# Patient Record
Sex: FEMALE | Race: BLACK OR AFRICAN AMERICAN | NOT HISPANIC OR LATINO | URBAN - METROPOLITAN AREA
[De-identification: names, ages, dates, MRNs, and addresses within clinical notes are randomized per-mention and may not be internally consistent; named-entity substitution may affect disease eponyms.]

---

## 2018-01-17 ENCOUNTER — EMERGENCY (EMERGENCY)
Facility: HOSPITAL | Age: 38
LOS: 1 days | Discharge: HOME | End: 2018-01-17
Attending: EMERGENCY MEDICINE | Admitting: EMERGENCY MEDICINE
Payer: COMMERCIAL

## 2018-01-17 VITALS
DIASTOLIC BLOOD PRESSURE: 86 MMHG | TEMPERATURE: 98 F | RESPIRATION RATE: 16 BRPM | OXYGEN SATURATION: 98 % | HEART RATE: 76 BPM | SYSTOLIC BLOOD PRESSURE: 148 MMHG

## 2018-01-17 VITALS
TEMPERATURE: 98 F | OXYGEN SATURATION: 98 % | SYSTOLIC BLOOD PRESSURE: 181 MMHG | HEART RATE: 69 BPM | DIASTOLIC BLOOD PRESSURE: 98 MMHG | WEIGHT: 293 LBS | RESPIRATION RATE: 18 BRPM

## 2018-01-17 DIAGNOSIS — B34.9 VIRAL INFECTION, UNSPECIFIED: ICD-10-CM

## 2018-01-17 DIAGNOSIS — Z79.899 OTHER LONG TERM (CURRENT) DRUG THERAPY: ICD-10-CM

## 2018-01-17 DIAGNOSIS — J11.1 INFLUENZA DUE TO UNIDENTIFIED INFLUENZA VIRUS WITH OTHER RESPIRATORY MANIFESTATIONS: ICD-10-CM

## 2018-01-17 LAB
HCOV NL63 RNA SPEC QL NAA+PROBE: DETECTED
RAPID RVP RESULT: DETECTED

## 2018-01-17 PROCEDURE — 87633 RESP VIRUS 12-25 TARGETS: CPT

## 2018-01-17 PROCEDURE — 87581 M.PNEUMON DNA AMP PROBE: CPT

## 2018-01-17 PROCEDURE — 71046 X-RAY EXAM CHEST 2 VIEWS: CPT

## 2018-01-17 PROCEDURE — 87486 CHLMYD PNEUM DNA AMP PROBE: CPT

## 2018-01-17 PROCEDURE — 99284 EMERGENCY DEPT VISIT MOD MDM: CPT

## 2018-01-17 PROCEDURE — 71046 X-RAY EXAM CHEST 2 VIEWS: CPT | Mod: 26

## 2018-01-17 PROCEDURE — 99283 EMERGENCY DEPT VISIT LOW MDM: CPT | Mod: 25

## 2018-01-17 PROCEDURE — 87798 DETECT AGENT NOS DNA AMP: CPT

## 2018-01-17 RX ORDER — IBUPROFEN 200 MG
600 TABLET ORAL ONCE
Qty: 0 | Refills: 0 | Status: COMPLETED | OUTPATIENT
Start: 2018-01-17 | End: 2018-01-17

## 2018-01-17 RX ORDER — DEXAMETHASONE 0.5 MG/5ML
10 ELIXIR ORAL ONCE
Qty: 0 | Refills: 0 | Status: COMPLETED | OUTPATIENT
Start: 2018-01-17 | End: 2018-01-17

## 2018-01-17 RX ADMIN — Medication 10 MILLIGRAM(S): at 20:19

## 2018-01-17 RX ADMIN — Medication 600 MILLIGRAM(S): at 19:57

## 2018-01-17 RX ADMIN — Medication 600 MILLIGRAM(S): at 21:34

## 2018-01-17 NOTE — ED ADULT NURSE NOTE - OBJECTIVE STATEMENT
patient is a 38 y/o female complaining of cough since monday. patient states "my mom and my daughter are sick at home and I started to feel sick monday, I cough all day and nothing comes up, its starting to hurt". patient denies fevers, but reports nonproductive cough.

## 2018-01-17 NOTE — ED PROVIDER NOTE - PHYSICAL EXAMINATION
VITAL SIGNS: I have reviewed nursing notes and confirm.  CONSTITUTIONAL: Well-developed; well-nourished female comfortable in stretcher speaking clearly in complete sentences; in no acute distress.  SKIN: Agree with RN documentation regarding decubitus evaluation. Remainder of skin exam is warm and dry, no acute rash.  HEAD: Normocephalic; atraumatic.  EYES: PERRL, EOM intact; conjunctiva and sclera clear.  ENT: No nasal discharge; airway clear. no pharyngeal/tonsillar exudates or swelling, no petechiae  NECK: Supple; non tender.  CARD: S1, S2 normal; no murmurs, gallops, or rubs. RRR  RESP: CTA b/l w/good excursion, no inc wob or pleuritic pain.  ABD: Normal bowel sounds; soft; non-distended; non-tender  EXT: Normal ROM. No clubbing, cyanosis or edema.  LYMPH: No acute cervical adenopathy.  NEURO: Alert, oriented. Grossly unremarkable.  PSYCH: Cooperative, appropriate.

## 2018-01-17 NOTE — ED PROVIDER NOTE - OBJECTIVE STATEMENT
36 y/o F non-smoker w/no sig pmhx p/w tactile fever, chills, cough productive with white/yellow sputim, myalgias, rhinorrhea, fatigue for 2 days with sick contacts in her home with same sx. No abd pain or n/v/c/d. Denies urinary sx. No CP/SOB.

## 2018-01-17 NOTE — ED PROVIDER NOTE - MEDICAL DECISION MAKING DETAILS
+ viral infection, no infiltrate on CXR, saturating well, comfortable, no fever. Safe to d/c home with supportive care. Given return precautions and anticipatory guidance.

## 2018-07-31 ENCOUNTER — EMERGENCY (EMERGENCY)
Facility: HOSPITAL | Age: 38
LOS: 1 days | Discharge: HOME | End: 2018-07-31
Attending: EMERGENCY MEDICINE | Admitting: EMERGENCY MEDICINE
Payer: COMMERCIAL

## 2018-07-31 VITALS
HEART RATE: 80 BPM | RESPIRATION RATE: 18 BRPM | TEMPERATURE: 98 F | SYSTOLIC BLOOD PRESSURE: 154 MMHG | OXYGEN SATURATION: 96 % | WEIGHT: 293 LBS | DIASTOLIC BLOOD PRESSURE: 94 MMHG

## 2018-07-31 VITALS
DIASTOLIC BLOOD PRESSURE: 79 MMHG | SYSTOLIC BLOOD PRESSURE: 118 MMHG | TEMPERATURE: 98 F | RESPIRATION RATE: 18 BRPM | OXYGEN SATURATION: 99 % | HEART RATE: 65 BPM

## 2018-07-31 DIAGNOSIS — R60.0 LOCALIZED EDEMA: ICD-10-CM

## 2018-07-31 DIAGNOSIS — B20 HUMAN IMMUNODEFICIENCY VIRUS [HIV] DISEASE: ICD-10-CM

## 2018-07-31 DIAGNOSIS — Z79.899 OTHER LONG TERM (CURRENT) DRUG THERAPY: ICD-10-CM

## 2018-07-31 PROBLEM — I10 ESSENTIAL (PRIMARY) HYPERTENSION: Chronic | Status: ACTIVE | Noted: 2018-01-17

## 2018-07-31 LAB
ALBUMIN SERPL ELPH-MCNC: 4.1 G/DL — SIGNIFICANT CHANGE UP (ref 3.3–5)
ALP SERPL-CCNC: 38 U/L — LOW (ref 40–120)
ALT FLD-CCNC: 12 U/L — SIGNIFICANT CHANGE UP (ref 10–45)
ANION GAP SERPL CALC-SCNC: 13 MMOL/L — SIGNIFICANT CHANGE UP (ref 5–17)
AST SERPL-CCNC: 18 U/L — SIGNIFICANT CHANGE UP (ref 10–40)
BASOPHILS NFR BLD AUTO: 0.3 % — SIGNIFICANT CHANGE UP (ref 0–2)
BILIRUB SERPL-MCNC: 0.6 MG/DL — SIGNIFICANT CHANGE UP (ref 0.2–1.2)
BUN SERPL-MCNC: 12 MG/DL — SIGNIFICANT CHANGE UP (ref 7–23)
CALCIUM SERPL-MCNC: 10 MG/DL — SIGNIFICANT CHANGE UP (ref 8.4–10.5)
CHLORIDE SERPL-SCNC: 101 MMOL/L — SIGNIFICANT CHANGE UP (ref 96–108)
CO2 SERPL-SCNC: 25 MMOL/L — SIGNIFICANT CHANGE UP (ref 22–31)
CREAT SERPL-MCNC: 0.86 MG/DL — SIGNIFICANT CHANGE UP (ref 0.5–1.3)
EOSINOPHIL NFR BLD AUTO: 1.6 % — SIGNIFICANT CHANGE UP (ref 0–6)
GLUCOSE SERPL-MCNC: 100 MG/DL — HIGH (ref 70–99)
HCT VFR BLD CALC: 34.3 % — LOW (ref 34.5–45)
HGB BLD-MCNC: 11.2 G/DL — LOW (ref 11.5–15.5)
LYMPHOCYTES # BLD AUTO: 39.7 % — SIGNIFICANT CHANGE UP (ref 13–44)
MCHC RBC-ENTMCNC: 28.8 PG — SIGNIFICANT CHANGE UP (ref 27–34)
MCHC RBC-ENTMCNC: 32.7 G/DL — SIGNIFICANT CHANGE UP (ref 32–36)
MCV RBC AUTO: 88.2 FL — SIGNIFICANT CHANGE UP (ref 80–100)
MONOCYTES NFR BLD AUTO: 7.3 % — SIGNIFICANT CHANGE UP (ref 2–14)
NEUTROPHILS NFR BLD AUTO: 51.1 % — SIGNIFICANT CHANGE UP (ref 43–77)
PLATELET # BLD AUTO: 265 K/UL — SIGNIFICANT CHANGE UP (ref 150–400)
POTASSIUM SERPL-MCNC: 4.2 MMOL/L — SIGNIFICANT CHANGE UP (ref 3.5–5.3)
POTASSIUM SERPL-SCNC: 4.2 MMOL/L — SIGNIFICANT CHANGE UP (ref 3.5–5.3)
PROT SERPL-MCNC: 7.7 G/DL — SIGNIFICANT CHANGE UP (ref 6–8.3)
RBC # BLD: 3.89 M/UL — SIGNIFICANT CHANGE UP (ref 3.8–5.2)
RBC # FLD: 14.9 % — SIGNIFICANT CHANGE UP (ref 10.3–16.9)
SODIUM SERPL-SCNC: 139 MMOL/L — SIGNIFICANT CHANGE UP (ref 135–145)
WBC # BLD: 11.5 K/UL — HIGH (ref 3.8–10.5)
WBC # FLD AUTO: 11.5 K/UL — HIGH (ref 3.8–10.5)

## 2018-07-31 PROCEDURE — 99284 EMERGENCY DEPT VISIT MOD MDM: CPT

## 2018-07-31 PROCEDURE — 93970 EXTREMITY STUDY: CPT

## 2018-07-31 PROCEDURE — 85025 COMPLETE CBC W/AUTO DIFF WBC: CPT

## 2018-07-31 PROCEDURE — 83880 ASSAY OF NATRIURETIC PEPTIDE: CPT

## 2018-07-31 PROCEDURE — 71046 X-RAY EXAM CHEST 2 VIEWS: CPT

## 2018-07-31 PROCEDURE — 80053 COMPREHEN METABOLIC PANEL: CPT

## 2018-07-31 PROCEDURE — 99284 EMERGENCY DEPT VISIT MOD MDM: CPT | Mod: 25

## 2018-07-31 PROCEDURE — 93970 EXTREMITY STUDY: CPT | Mod: 26

## 2018-07-31 PROCEDURE — 71046 X-RAY EXAM CHEST 2 VIEWS: CPT | Mod: 26

## 2018-07-31 NOTE — ED ADULT NURSE NOTE - CHIEF COMPLAINT QUOTE
pt c/o bilateral leg swelling and right knee pain since Saturday. pt denies fall or recent injuries. pmh Hypertension. pt reports taking her water pill and still retaining fluids.

## 2018-07-31 NOTE — ED PROVIDER NOTE - OBJECTIVE STATEMENT
38 year old female with history of HIV presents to ED with concern for bilateral lower extremity edema which she feels has increased over the past 2 days.  Patient states she has been more ambulatory lately walking around the city with her daughter and on her feet at work as a .  She denies associated shortness of breath, abdominal pain/distension, fever, chills, chest pain, history of DVT/PE or any additional acute complaints or concerns at this time.

## 2018-07-31 NOTE — ED ADULT TRIAGE NOTE - CHIEF COMPLAINT QUOTE
pt c/o bilateral leg swelling and right knee pain since Saturday. pt denies fall or recent injuries. pmh Hypertension. pt c/o bilateral leg swelling and right knee pain since Saturday. pt denies fall or recent injuries. pmh Hypertension. pt reports taking her water pill and still retaining fluids.

## 2018-07-31 NOTE — ED PROVIDER NOTE - MEDICAL DECISION MAKING DETAILS
pt in ED w concern for bilateral LE edema, worsening over the past 2 days.  Hx of baseline edema noted.  Labs unremarkable.   CXR reviewed.  DVT studies ordered and without evidence of deep venous thrombosis.  All results are discussed with patient at bedside.  I have educated patient regarding use of compression stockings, lifting extremities while at rest and need for prompt PCP follow up in 1-2 days for re evaluation and possible discussion of water pill use, etc.  Patient is instructed to return immediately to ED should her symptoms worsen or if she has any concern prior to this follow up.  Patient aware of plan and verbalizes her understanding.

## 2018-07-31 NOTE — ED PROVIDER NOTE - MUSCULOSKELETAL, MLM
+ non pitting edema to bilateral LEs.  No overlying cellulitis or skin breakdown.  Spine appears normal, range of motion is not limited, no muscle or joint tenderness

## 2018-07-31 NOTE — ED ADULT NURSE NOTE - OBJECTIVE STATEMENT
Pt c/o bilateral knee/leg pain and bilateral lower leg swelling. Pt states she has hx of R knee injury two years ago and every now and then her R knee will hurt and swell, but this time both legs hurt. Pt states she's on HTN medication daily, but unsure of the name.

## 2018-07-31 NOTE — ED ADULT NURSE NOTE - NSIMPLEMENTINTERV_GEN_ALL_ED
Implemented All Universal Safety Interventions:  [place] to call system. Call bell, personal items and telephone within reach. Instruct patient to call for assistance. Room bathroom lighting operational. Non-slip footwear when patient is off stretcher. Physically safe environment: no spills, clutter or unnecessary equipment. Stretcher in lowest position, wheels locked, appropriate side rails in place.

## 2018-07-31 NOTE — ED PROVIDER NOTE - MUSCULOSKELETAL NEGATIVE STATEMENT, MLM
+ bilateral lower extremity swelling.  no back pain, no gout, no musculoskeletal pain, no neck pain, and no weakness.

## 2019-02-25 NOTE — ED ADULT TRIAGE NOTE - MODE OF ARRIVAL
Suspected by history  Kit continues to maintain full testable distal median motor and sensory function  --scheduled for electrodiagnostic study on March 13th   --also will have her TSH drawn along with her above outlined blood work  Walk in

## 2019-03-19 ENCOUNTER — EMERGENCY (EMERGENCY)
Facility: HOSPITAL | Age: 39
LOS: 1 days | Discharge: HOME | End: 2019-03-19
Attending: EMERGENCY MEDICINE | Admitting: EMERGENCY MEDICINE
Payer: COMMERCIAL

## 2019-03-19 VITALS
HEART RATE: 72 BPM | TEMPERATURE: 98 F | SYSTOLIC BLOOD PRESSURE: 145 MMHG | OXYGEN SATURATION: 99 % | DIASTOLIC BLOOD PRESSURE: 97 MMHG | RESPIRATION RATE: 18 BRPM

## 2019-03-19 VITALS
RESPIRATION RATE: 18 BRPM | HEART RATE: 88 BPM | SYSTOLIC BLOOD PRESSURE: 153 MMHG | DIASTOLIC BLOOD PRESSURE: 101 MMHG | OXYGEN SATURATION: 98 % | TEMPERATURE: 99 F

## 2019-03-19 DIAGNOSIS — Z79.899 OTHER LONG TERM (CURRENT) DRUG THERAPY: ICD-10-CM

## 2019-03-19 DIAGNOSIS — R05 COUGH: ICD-10-CM

## 2019-03-19 DIAGNOSIS — J06.9 ACUTE UPPER RESPIRATORY INFECTION, UNSPECIFIED: ICD-10-CM

## 2019-03-19 DIAGNOSIS — I10 ESSENTIAL (PRIMARY) HYPERTENSION: ICD-10-CM

## 2019-03-19 LAB
HCOV PNL SPEC NAA+PROBE: DETECTED
RAPID RVP RESULT: DETECTED

## 2019-03-19 PROCEDURE — 87581 M.PNEUMON DNA AMP PROBE: CPT

## 2019-03-19 PROCEDURE — 87486 CHLMYD PNEUM DNA AMP PROBE: CPT

## 2019-03-19 PROCEDURE — 71046 X-RAY EXAM CHEST 2 VIEWS: CPT | Mod: 26

## 2019-03-19 PROCEDURE — 71046 X-RAY EXAM CHEST 2 VIEWS: CPT

## 2019-03-19 PROCEDURE — 87633 RESP VIRUS 12-25 TARGETS: CPT

## 2019-03-19 PROCEDURE — 99283 EMERGENCY DEPT VISIT LOW MDM: CPT | Mod: 25

## 2019-03-19 PROCEDURE — 87798 DETECT AGENT NOS DNA AMP: CPT

## 2019-03-19 RX ORDER — IBUPROFEN 200 MG
600 TABLET ORAL ONCE
Qty: 0 | Refills: 0 | Status: COMPLETED | OUTPATIENT
Start: 2019-03-19 | End: 2019-03-19

## 2019-03-19 RX ORDER — BENZOCAINE AND MENTHOL 5; 1 G/100ML; G/100ML
1 LIQUID ORAL ONCE
Qty: 0 | Refills: 0 | Status: COMPLETED | OUTPATIENT
Start: 2019-03-19 | End: 2019-03-19

## 2019-03-19 RX ORDER — ACETAMINOPHEN 500 MG
650 TABLET ORAL ONCE
Qty: 0 | Refills: 0 | Status: COMPLETED | OUTPATIENT
Start: 2019-03-19 | End: 2019-03-19

## 2019-03-19 RX ADMIN — BENZOCAINE AND MENTHOL 1 LOZENGE: 5; 1 LIQUID ORAL at 12:43

## 2019-03-19 RX ADMIN — Medication 650 MILLIGRAM(S): at 12:44

## 2019-03-19 RX ADMIN — Medication 200 MILLIGRAM(S): at 12:44

## 2019-03-19 RX ADMIN — Medication 600 MILLIGRAM(S): at 12:44

## 2019-03-19 NOTE — ED PROVIDER NOTE - NSFOLLOWUPINSTRUCTIONS_ED_ALL_ED_FT
Take dayquil (or nyquil depending on time of day) for your symptoms  Use cepacol for sore throat  Drink tea with honey for sore throat and cough  Drink plenty of clear fluids    Follow up with your primary care doctor if symptoms fail to improve  Return to the Emergency Department if you develop fever>100.4F, worsening cough, shortness of breath, or any other concerns.

## 2019-03-19 NOTE — ED PROVIDER NOTE - ATTENDING CONTRIBUTION TO CARE
37 yo female h/o obesity, HTN c/o uri sx x 2 days - subjective fever, myalgias, rhinorrhea, nasal congestion, sore throat, cough w/o cp/sob; sx started after ill exposure on public transport.  No travel.  Pt using tylenol cold and sinus w/o sig relief.  No abdominal pain, N/V/D. Well appearing, nad, nc/at, lung cta, heart reg, abd soft, nt, ext no gross deformity, no gross neuro deficits  Pt w viral uri sx - supportive care reviewed; + coronavirus on rvp, nl cxr; will dc to fu pmd.

## 2019-03-19 NOTE — ED ADULT NURSE NOTE - NSFALLRSKASSESSTYPE_ED_ALL_ED
Initial (On Arrival) no dermatitis, no environmental allergies, no food allergies, no immunosuppressive disorder, and no pruritus.

## 2019-03-19 NOTE — ED PROVIDER NOTE - CLINICAL SUMMARY MEDICAL DECISION MAKING FREE TEXT BOX
Given tylenol 650mg po, ibuprofen 600mg po, cepacol drop and tessalon perles while in ED for sx. HCG neg. RVP pending. CXR pending. Lungs CTAB, no increased work of breathing. Given tylenol 650mg po, ibuprofen 600mg po, cepacol drop and tessalon perles while in ED for sx. HCG neg. RVP +coronavirus. CXR without acute cardiopulmonary abnormality. Discussed all results with patient. Continue symptomatic management at home. Return precautions given.

## 2019-03-19 NOTE — ED PROVIDER NOTE - OBJECTIVE STATEMENT
39yo female with pmhx of HTN presents with 2 days of subj fever, nasal congestion, sore throat, cough and body aches. Reports symptoms began after someone on the bus coughed on her. Took tylenol cold and sinus x1 without relief. Denies ear pain, difficulty swallowing, sputum production, shortness of breath, abdominal pain, N/V/D. Reports influenza vaccine this year. Denies hx of asthma. Denies smoking hx.

## 2019-03-19 NOTE — ED PROVIDER NOTE - NS ED ROS FT
Constitutional: +subj fever. No chills.  Eyes: No redness. No discharge. No vision change.   ENT: +sore throat. No ear pain.  Cardiovascular: No chest pain. No leg swelling.  Respiratory: +cough. No shortness of breath.  GI: No abdominal pain. No vomiting. No diarrhea.   MSK: No joint pain. No back pain.   Skin: No rash. No abrasions.   Neuro: No numbness. No weakness.   Psych: No known mental health issues.

## 2019-03-19 NOTE — ED ADULT NURSE NOTE - OBJECTIVE STATEMENT
Pt presents to ED reporting body aches, headache, runny nose, sore throat, and non productive cough since Saturday evening. Reports no relief with tylenol cold and sinus yesterday at home. Denies fever. No SOB. Hx HTN.

## 2019-03-19 NOTE — ED ADULT TRIAGE NOTE - CHIEF COMPLAINT QUOTE
pt c/o cough, body aches, chills, runny nose, chest pain since Sunday. mask applied. pt has history of HTN and did not take BP meds today.

## 2019-03-19 NOTE — ED PROVIDER NOTE - PHYSICAL EXAMINATION
VITAL SIGNS: I have reviewed nursing notes and confirm.  CONSTITUTIONAL: Well-developed; well-nourished; in no acute distress.   SKIN:  warm and dry, no acute rash.   HEAD:  normocephalic, atraumatic.  EYES: PERRL, EOM intact; conjunctiva and sclera clear.  ENT: No nasal discharge; airway clear. No posterior oropharynx erythema. No tonsillar edema or exudates. Uvula midline. Maintaining secretions appropriately. TMs non-erythematous, non-bulging bilaterally.   NECK: Supple; non tender.  CARD: S1, S2 normal; no murmurs, gallops, or rubs. Regular rate and rhythm.   RESP:  Clear to auscultation b/l, no wheezes, rales or rhonchi.  ABD: Normal bowel sounds; soft; non-distended; non-tender; no guarding/ rebound.  EXT: Normal ROM. No clubbing, cyanosis or edema. 2+ pulses to b/l ue/le.  NEURO: Alert, oriented, grossly unremarkable  PSYCH: Cooperative, mood and affect appropriate.

## 2019-07-03 ENCOUNTER — EMERGENCY (EMERGENCY)
Facility: HOSPITAL | Age: 39
LOS: 1 days | Discharge: HOME | End: 2019-07-03
Admitting: EMERGENCY MEDICINE
Payer: COMMERCIAL

## 2019-07-03 VITALS
RESPIRATION RATE: 18 BRPM | WEIGHT: 293 LBS | HEART RATE: 93 BPM | DIASTOLIC BLOOD PRESSURE: 104 MMHG | OXYGEN SATURATION: 98 % | TEMPERATURE: 98 F | SYSTOLIC BLOOD PRESSURE: 149 MMHG | HEIGHT: 66 IN

## 2019-07-03 DIAGNOSIS — M25.561 PAIN IN RIGHT KNEE: ICD-10-CM

## 2019-07-03 DIAGNOSIS — G89.29 OTHER CHRONIC PAIN: ICD-10-CM

## 2019-07-03 PROCEDURE — 99282 EMERGENCY DEPT VISIT SF MDM: CPT

## 2019-07-03 RX ORDER — ACETAMINOPHEN 500 MG
650 TABLET ORAL ONCE
Refills: 0 | Status: COMPLETED | OUTPATIENT
Start: 2019-07-03 | End: 2019-07-03

## 2019-07-03 RX ADMIN — Medication 650 MILLIGRAM(S): at 20:32

## 2019-07-03 NOTE — ED PROVIDER NOTE - CARE PROVIDER_API CALL
Williams Chase)  Orthopaedic Surgery  [address]  [place], [place] [address]  Phone: [phone]  Fax: [phone]  Follow Up Time:

## 2019-07-03 NOTE — ED PROVIDER NOTE - CLINICAL SUMMARY MEDICAL DECISION MAKING FREE TEXT BOX
40 yo female, morbidly obese, in the Er due to worsening of her right knee pain. Pt reports chronic b/l knee pain, but right worse now. No known injuries, no new leg edema or discolorations. pt reports that at work she has to stand for 8  hours and its makes her pain worse. No clinical suspicion for any fx/dislocation, or infection to knee joints. plan: symptomatic treatment, ortho f/u. pt agrees with a treatment plan, requests day off from work.

## 2019-07-03 NOTE — ED PROVIDER NOTE - OBJECTIVE STATEMENT
38 y/o F with obesity, HTN on HCTZ and HIV (unsure of CD4 count and undetectable viral load) presents to the ED with complaints of chronic R knee pain. Patient works as a  and notes symptoms worsened with constant standing. Patient without new injury or neurological deficits. Patient has not taken any medications for pain. Denies redness, discoloration or any other acute complaints. 40 y/o female, morbidly obese with h/o HTN , HIV (unsure of CD4 count and undetectable viral load) presents to the ED with complaints of worsening of her chronic knees pain. Patient works as a , notes that pain became worse for the past 3 days. Pt reports that she has no more chair to sit and rest at work and now she has to stand for 8 hours. Patient denies any  new injury to her knees, denies new leg swelling, Patient has not taken any  medications for pain.

## 2019-07-03 NOTE — ED ADULT NURSE REASSESSMENT NOTE - NS ED NURSE REASSESS COMMENT FT1
Pt with high BP, provider aware, per pt Hx HTN, working on med regiment with PCP and did not take meds today. No HA/dizziness/CP/SOB. Pt to f/u with PCP next week, dc'd without difficulty and ambulated from ED with steady gait.

## 2019-07-03 NOTE — ED ADULT NURSE NOTE - OBJECTIVE STATEMENT
40 y/o female c/o right knee pain that began 3 days ago w/o any associated sx. Denies numbness, tingling, redness, and any other sx.

## 2019-07-03 NOTE — ED PROVIDER NOTE - CONSTITUTIONAL, MLM
normal... Well appearing, Obese, well nourished and in no apparent distress. Well appearing, in no apparent distress.

## 2019-07-03 NOTE — ED ADULT NURSE NOTE - NSIMPLEMENTINTERV_GEN_ALL_ED
Implemented All Universal Safety Interventions:  [place] to call system. Call bell, personal items and telephone within reach. Instruct patient to call for assistance. Room bathroom lighting operational. Non-slip footwear when patient is off stretcher. Physically safe environment: no spills, clutter or unnecessary equipment. Stretcher in lowest position, wheels locked, appropriate side rails in place.

## 2019-07-03 NOTE — ED PROVIDER NOTE - MUSCULOSKELETAL, MLM
Spine appears normal, all extremities grossly intact and range of motion is not limited, b/l  LE  chronic edema, good distal pulses, no discolorations, b/l knee- NROM,

## 2019-09-18 ENCOUNTER — EMERGENCY (EMERGENCY)
Facility: HOSPITAL | Age: 39
LOS: 1 days | Discharge: HOME | End: 2019-09-18
Attending: EMERGENCY MEDICINE | Admitting: EMERGENCY MEDICINE
Payer: COMMERCIAL

## 2019-09-18 VITALS
OXYGEN SATURATION: 98 % | RESPIRATION RATE: 20 BRPM | SYSTOLIC BLOOD PRESSURE: 157 MMHG | DIASTOLIC BLOOD PRESSURE: 93 MMHG | HEIGHT: 67 IN | HEART RATE: 88 BPM | TEMPERATURE: 98 F | WEIGHT: 293 LBS

## 2019-09-18 PROBLEM — B20 HUMAN IMMUNODEFICIENCY VIRUS [HIV] DISEASE: Chronic | Status: ACTIVE | Noted: 2019-07-04

## 2019-09-18 LAB
ALBUMIN SERPL ELPH-MCNC: 4 G/DL — SIGNIFICANT CHANGE UP (ref 3.3–5)
ALP SERPL-CCNC: 50 U/L — SIGNIFICANT CHANGE UP (ref 40–120)
ALT FLD-CCNC: 13 U/L — SIGNIFICANT CHANGE UP (ref 10–45)
ANION GAP SERPL CALC-SCNC: 10 MMOL/L — SIGNIFICANT CHANGE UP (ref 5–17)
AST SERPL-CCNC: 19 U/L — SIGNIFICANT CHANGE UP (ref 10–40)
BASOPHILS # BLD AUTO: 0.03 K/UL — SIGNIFICANT CHANGE UP (ref 0–0.2)
BASOPHILS NFR BLD AUTO: 0.3 % — SIGNIFICANT CHANGE UP (ref 0–2)
BILIRUB SERPL-MCNC: 0.5 MG/DL — SIGNIFICANT CHANGE UP (ref 0.2–1.2)
BUN SERPL-MCNC: 14 MG/DL — SIGNIFICANT CHANGE UP (ref 7–23)
CALCIUM SERPL-MCNC: 9.7 MG/DL — SIGNIFICANT CHANGE UP (ref 8.4–10.5)
CHLORIDE SERPL-SCNC: 105 MMOL/L — SIGNIFICANT CHANGE UP (ref 96–108)
CO2 SERPL-SCNC: 25 MMOL/L — SIGNIFICANT CHANGE UP (ref 22–31)
CREAT SERPL-MCNC: 0.86 MG/DL — SIGNIFICANT CHANGE UP (ref 0.5–1.3)
EOSINOPHIL # BLD AUTO: 0.2 K/UL — SIGNIFICANT CHANGE UP (ref 0–0.5)
EOSINOPHIL NFR BLD AUTO: 1.9 % — SIGNIFICANT CHANGE UP (ref 0–6)
GLUCOSE SERPL-MCNC: 94 MG/DL — SIGNIFICANT CHANGE UP (ref 70–99)
HCT VFR BLD CALC: 35.9 % — SIGNIFICANT CHANGE UP (ref 34.5–45)
HGB BLD-MCNC: 11.4 G/DL — LOW (ref 11.5–15.5)
IMM GRANULOCYTES NFR BLD AUTO: 0.3 % — SIGNIFICANT CHANGE UP (ref 0–1.5)
LYMPHOCYTES # BLD AUTO: 39.7 % — SIGNIFICANT CHANGE UP (ref 13–44)
LYMPHOCYTES # BLD AUTO: 4.29 K/UL — HIGH (ref 1–3.3)
MCHC RBC-ENTMCNC: 28.8 PG — SIGNIFICANT CHANGE UP (ref 27–34)
MCHC RBC-ENTMCNC: 31.8 GM/DL — LOW (ref 32–36)
MCV RBC AUTO: 90.7 FL — SIGNIFICANT CHANGE UP (ref 80–100)
MONOCYTES # BLD AUTO: 0.84 K/UL — SIGNIFICANT CHANGE UP (ref 0–0.9)
MONOCYTES NFR BLD AUTO: 7.8 % — SIGNIFICANT CHANGE UP (ref 2–14)
NEUTROPHILS # BLD AUTO: 5.41 K/UL — SIGNIFICANT CHANGE UP (ref 1.8–7.4)
NEUTROPHILS NFR BLD AUTO: 50 % — SIGNIFICANT CHANGE UP (ref 43–77)
NRBC # BLD: 0 /100 WBCS — SIGNIFICANT CHANGE UP (ref 0–0)
NT-PROBNP SERPL-SCNC: 91 PG/ML — SIGNIFICANT CHANGE UP (ref 0–300)
PLATELET # BLD AUTO: 267 K/UL — SIGNIFICANT CHANGE UP (ref 150–400)
POTASSIUM SERPL-MCNC: 4.9 MMOL/L — SIGNIFICANT CHANGE UP (ref 3.5–5.3)
POTASSIUM SERPL-SCNC: 4.9 MMOL/L — SIGNIFICANT CHANGE UP (ref 3.5–5.3)
PROT SERPL-MCNC: 7.9 G/DL — SIGNIFICANT CHANGE UP (ref 6–8.3)
RBC # BLD: 3.96 M/UL — SIGNIFICANT CHANGE UP (ref 3.8–5.2)
RBC # FLD: 14.5 % — SIGNIFICANT CHANGE UP (ref 10.3–14.5)
SODIUM SERPL-SCNC: 140 MMOL/L — SIGNIFICANT CHANGE UP (ref 135–145)
WBC # BLD: 10.8 K/UL — HIGH (ref 3.8–10.5)
WBC # FLD AUTO: 10.8 K/UL — HIGH (ref 3.8–10.5)

## 2019-09-18 PROCEDURE — 99284 EMERGENCY DEPT VISIT MOD MDM: CPT

## 2019-09-18 PROCEDURE — 99283 EMERGENCY DEPT VISIT LOW MDM: CPT

## 2019-09-18 PROCEDURE — 36415 COLL VENOUS BLD VENIPUNCTURE: CPT

## 2019-09-18 PROCEDURE — 80053 COMPREHEN METABOLIC PANEL: CPT

## 2019-09-18 PROCEDURE — 85025 COMPLETE CBC W/AUTO DIFF WBC: CPT

## 2019-09-18 PROCEDURE — 83880 ASSAY OF NATRIURETIC PEPTIDE: CPT

## 2019-09-18 NOTE — ED PROVIDER NOTE - NSFOLLOWUPINSTRUCTIONS_ED_ALL_ED_FT
As we discussed, wear compression stockings for swelling or use the ACE wraps given. Keep the legs elevated above the level of the heart when you are able to. You need to see a Vascular Surgery doctor - you may call to make an appointment or call our Referrals Coordinator, Pricila, for help at [phone].     Edema (SWELLING)    Edema is when you have too much fluid in your body or under your skin. Edema may make your legs, feet, and ankles swell up. Swelling is also common in looser tissues, like around your eyes. This is a common condition. It gets more common as you get older. There are many possible causes of edema. Eating too much salt (sodium) and being on your feet or sitting for a long time can cause edema in your legs, feet, and ankles. Hot weather may make edema worse.    Edema is usually painless. Your skin may look swollen or shiny.    Follow these instructions at home:  Keep the swollen body part raised (elevated) above the level of your heart when you are sitting or lying down.  Do not sit still or stand for a long time.  Do not wear tight clothes. Do not wear garters on your upper legs.  Exercise your legs. This can help the swelling go down.  Wear elastic bandages or support stockings as told by your doctor.  Eat a low-salt (low-sodium) diet to reduce fluid as told by your doctor.  Depending on the cause of your swelling, you may need to limit how much fluid you drink (fluid restriction).  Take over-the-counter and prescription medicines only as told by your doctor.  Contact a doctor if:  Treatment is not working.  You have heart, liver, or kidney disease and have symptoms of edema.  You have sudden and unexplained weight gain.  Get help right away if:  You have shortness of breath or chest pain.  You cannot breathe when you lie down.  You have pain, redness, or warmth in the swollen areas.  You have heart, liver, or kidney disease and get edema all of a sudden.  You have a fever and your symptoms get worse all of a sudden.  Summary  Edema is when you have too much fluid in your body or under your skin.  Edema may make your legs, feet, and ankles swell up. Swelling is also common in looser tissues, like around your eyes.  Raise (elevate) the swollen body part above the level of your heart when you are sitting or lying down.  Follow your doctor's instructions about diet and how much fluid you can drink (fluid restriction).  This information is not intended to replace advice given to you by your health care provider. Make sure you discuss any questions you have with your health care provider.

## 2019-09-18 NOTE — ED ADULT NURSE NOTE - OBJECTIVE STATEMENT
40 y/o F a&ox4 walked in from front triage c.o bilateral lower leg swelling. pt reports swelling x mos and has a followed up with ortho specialist in october. swelling and pain has worsened since Monday. no numbness, tingling weakness, no fevers, chills. denies SOB, chest pain, n/v/d.  dopplers for bilateral DVTs negative.

## 2019-09-18 NOTE — ED PROVIDER NOTE - PATIENT PORTAL LINK FT
You can access the FollowMyHealth Patient Portal offered by [facility] by registering at the following website: [email or web address]. By joining [facility]’s FollowMyHealth portal, you will also be able to view your health information using other applications (apps) compatible with our system.

## 2019-09-18 NOTE — ED PROVIDER NOTE - PROVIDER TOKENS
PROVIDER:[TOKEN:[[id number]],FOLLOWUP:[4-6 Days]],PROVIDER:[TOKEN:[[id number]],FOLLOWUP:[4-6 Days]]

## 2019-09-18 NOTE — ED PROVIDER NOTE - OBJECTIVE STATEMENT
39y female h/o b/l LE swelling over 1yr, worsening past few months, here for same. No fever, cp, sob. No known cardiac/hepatic/renal problems. No travel. Has had b/l LE Dopplers (-) for DVT, on lasix 12.5mg daily, has appt w ortho in 1m for consult. States she has not had labs or been referred to vascular. Works night shift as  on feet. Not using compression stockings, minimal elevation at home. 39y female h/o HTN, HIV (unknown CD4), b/l LE swelling over 1yr, worsening past few months, here for same. No fever, cp, sob. No known cardiac/hepatic/renal problems. No travel. Has had b/l LE Dopplers (-) for DVT, on lasix 12.5mg daily, has appt w ortho in 1m for consult. Works night shift as  on feet. Not using compression stockings, minimal elevation at home.

## 2019-09-18 NOTE — ED PROVIDER NOTE - CARE PROVIDER_API CALL
Nadiya Lomas (MD)  Surgery; Vascular Surgery  [address], [address], [place] [address]  Phone: [phone]  Fax: [phone]  Follow Up Time: 4-6 Days    Paul Rain)  Surgery; Vascular Surgery  [address], [address], [place] [address]  Phone: [phone]  Fax: [phone]  Follow Up Time: 4-6 Days

## 2019-09-18 NOTE — ED PROVIDER NOTE - CARE PROVIDERS DIRECT ADDRESSES
[email or web address]

## 2019-09-18 NOTE — ED PROVIDER NOTE - CLINICAL SUMMARY MEDICAL DECISION MAKING FREE TEXT BOX
39y female with b/l LE lymphedema x >1yr, worsening recently. No other complaints/concerns. On lasix, had (-)Dopplers. 39y female with b/l LE lymphedema x >1yr, worsening recently. No other complaints/concerns. On lasix, had (-)Dopplers. HDS, no systemic symptoms. Labs ok. Given ACE wraps for compression - compression stockings recommended in addition to elevation + vascular as outpatient.

## 2019-09-18 NOTE — ED ADULT NURSE NOTE - CHPI ED NUR SYMPTOMS NEG
no pain/no weakness/no chills/no decreased eating/drinking/no tingling/no nausea/no vomiting/no dizziness/no fever

## 2019-09-18 NOTE — ED PROVIDER NOTE - SKIN, MLM
Skin normal color for race, warm, dry and intact. No evidence of rash. no LE cellulitis or skin changes.

## 2019-09-23 DIAGNOSIS — I89.0 LYMPHEDEMA, NOT ELSEWHERE CLASSIFIED: ICD-10-CM

## 2019-09-23 DIAGNOSIS — I10 ESSENTIAL (PRIMARY) HYPERTENSION: ICD-10-CM

## 2019-09-23 DIAGNOSIS — Z79.899 OTHER LONG TERM (CURRENT) DRUG THERAPY: ICD-10-CM

## 2019-09-23 DIAGNOSIS — M79.89 OTHER SPECIFIED SOFT TISSUE DISORDERS: ICD-10-CM

## 2019-11-04 ENCOUNTER — EMERGENCY (EMERGENCY)
Facility: HOSPITAL | Age: 39
LOS: 1 days | Discharge: HOME | End: 2019-11-04
Attending: EMERGENCY MEDICINE | Admitting: EMERGENCY MEDICINE
Payer: COMMERCIAL

## 2019-11-04 VITALS
SYSTOLIC BLOOD PRESSURE: 102 MMHG | HEART RATE: 79 BPM | HEIGHT: 67 IN | WEIGHT: 293 LBS | OXYGEN SATURATION: 100 % | DIASTOLIC BLOOD PRESSURE: 62 MMHG | RESPIRATION RATE: 18 BRPM | TEMPERATURE: 98 F

## 2019-11-04 LAB
ALBUMIN SERPL ELPH-MCNC: 4.1 G/DL — SIGNIFICANT CHANGE UP (ref 3.3–5)
ALP SERPL-CCNC: 41 U/L — SIGNIFICANT CHANGE UP (ref 40–120)
ALT FLD-CCNC: 12 U/L — SIGNIFICANT CHANGE UP (ref 10–45)
ANION GAP SERPL CALC-SCNC: 9 MMOL/L — SIGNIFICANT CHANGE UP (ref 5–17)
APPEARANCE UR: CLEAR — SIGNIFICANT CHANGE UP
AST SERPL-CCNC: 15 U/L — SIGNIFICANT CHANGE UP (ref 10–40)
BASOPHILS # BLD AUTO: 0.02 K/UL — SIGNIFICANT CHANGE UP (ref 0–0.2)
BASOPHILS NFR BLD AUTO: 0.2 % — SIGNIFICANT CHANGE UP (ref 0–2)
BILIRUB SERPL-MCNC: 0.5 MG/DL — SIGNIFICANT CHANGE UP (ref 0.2–1.2)
BILIRUB UR-MCNC: NEGATIVE — SIGNIFICANT CHANGE UP
BUN SERPL-MCNC: 11 MG/DL — SIGNIFICANT CHANGE UP (ref 7–23)
CALCIUM SERPL-MCNC: 9.5 MG/DL — SIGNIFICANT CHANGE UP (ref 8.4–10.5)
CHLORIDE SERPL-SCNC: 104 MMOL/L — SIGNIFICANT CHANGE UP (ref 96–108)
CO2 SERPL-SCNC: 24 MMOL/L — SIGNIFICANT CHANGE UP (ref 22–31)
COLOR SPEC: YELLOW — SIGNIFICANT CHANGE UP
CREAT SERPL-MCNC: 0.68 MG/DL — SIGNIFICANT CHANGE UP (ref 0.5–1.3)
DIFF PNL FLD: NEGATIVE — SIGNIFICANT CHANGE UP
EOSINOPHIL # BLD AUTO: 0.11 K/UL — SIGNIFICANT CHANGE UP (ref 0–0.5)
EOSINOPHIL NFR BLD AUTO: 1.3 % — SIGNIFICANT CHANGE UP (ref 0–6)
FLU A RESULT: SIGNIFICANT CHANGE UP
FLU A RESULT: SIGNIFICANT CHANGE UP
FLUAV AG NPH QL: SIGNIFICANT CHANGE UP
FLUBV AG NPH QL: SIGNIFICANT CHANGE UP
GLUCOSE SERPL-MCNC: 107 MG/DL — HIGH (ref 70–99)
GLUCOSE UR QL: NEGATIVE — SIGNIFICANT CHANGE UP
HCT VFR BLD CALC: 36.7 % — SIGNIFICANT CHANGE UP (ref 34.5–45)
HGB BLD-MCNC: 11.6 G/DL — SIGNIFICANT CHANGE UP (ref 11.5–15.5)
IMM GRANULOCYTES NFR BLD AUTO: 0.4 % — SIGNIFICANT CHANGE UP (ref 0–1.5)
KETONES UR-MCNC: NEGATIVE — SIGNIFICANT CHANGE UP
LEUKOCYTE ESTERASE UR-ACNC: NEGATIVE — SIGNIFICANT CHANGE UP
LIDOCAIN IGE QN: 15 U/L — SIGNIFICANT CHANGE UP (ref 7–60)
LYMPHOCYTES # BLD AUTO: 2.83 K/UL — SIGNIFICANT CHANGE UP (ref 1–3.3)
LYMPHOCYTES # BLD AUTO: 33.9 % — SIGNIFICANT CHANGE UP (ref 13–44)
MCHC RBC-ENTMCNC: 28.2 PG — SIGNIFICANT CHANGE UP (ref 27–34)
MCHC RBC-ENTMCNC: 31.6 GM/DL — LOW (ref 32–36)
MCV RBC AUTO: 89.3 FL — SIGNIFICANT CHANGE UP (ref 80–100)
MONOCYTES # BLD AUTO: 0.69 K/UL — SIGNIFICANT CHANGE UP (ref 0–0.9)
MONOCYTES NFR BLD AUTO: 8.3 % — SIGNIFICANT CHANGE UP (ref 2–14)
NEUTROPHILS # BLD AUTO: 4.67 K/UL — SIGNIFICANT CHANGE UP (ref 1.8–7.4)
NEUTROPHILS NFR BLD AUTO: 55.9 % — SIGNIFICANT CHANGE UP (ref 43–77)
NITRITE UR-MCNC: NEGATIVE — SIGNIFICANT CHANGE UP
NRBC # BLD: 0 /100 WBCS — SIGNIFICANT CHANGE UP (ref 0–0)
PH UR: 6 — SIGNIFICANT CHANGE UP (ref 5–8)
PLATELET # BLD AUTO: 264 K/UL — SIGNIFICANT CHANGE UP (ref 150–400)
POTASSIUM SERPL-MCNC: 3.9 MMOL/L — SIGNIFICANT CHANGE UP (ref 3.5–5.3)
POTASSIUM SERPL-SCNC: 3.9 MMOL/L — SIGNIFICANT CHANGE UP (ref 3.5–5.3)
PROT SERPL-MCNC: 7.8 G/DL — SIGNIFICANT CHANGE UP (ref 6–8.3)
PROT UR-MCNC: NEGATIVE MG/DL — SIGNIFICANT CHANGE UP
RBC # BLD: 4.11 M/UL — SIGNIFICANT CHANGE UP (ref 3.8–5.2)
RBC # FLD: 15 % — HIGH (ref 10.3–14.5)
RSV RESULT: SIGNIFICANT CHANGE UP
RSV RNA RESP QL NAA+PROBE: SIGNIFICANT CHANGE UP
SODIUM SERPL-SCNC: 137 MMOL/L — SIGNIFICANT CHANGE UP (ref 135–145)
SP GR SPEC: >=1.03 — SIGNIFICANT CHANGE UP (ref 1–1.03)
UROBILINOGEN FLD QL: 0.2 E.U./DL — SIGNIFICANT CHANGE UP
WBC # BLD: 8.35 K/UL — SIGNIFICANT CHANGE UP (ref 3.8–10.5)
WBC # FLD AUTO: 8.35 K/UL — SIGNIFICANT CHANGE UP (ref 3.8–10.5)

## 2019-11-04 PROCEDURE — 99284 EMERGENCY DEPT VISIT MOD MDM: CPT

## 2019-11-04 PROCEDURE — 87631 RESP VIRUS 3-5 TARGETS: CPT

## 2019-11-04 PROCEDURE — 99284 EMERGENCY DEPT VISIT MOD MDM: CPT | Mod: 25

## 2019-11-04 PROCEDURE — 83690 ASSAY OF LIPASE: CPT

## 2019-11-04 PROCEDURE — 96374 THER/PROPH/DIAG INJ IV PUSH: CPT

## 2019-11-04 PROCEDURE — 36415 COLL VENOUS BLD VENIPUNCTURE: CPT

## 2019-11-04 PROCEDURE — 80053 COMPREHEN METABOLIC PANEL: CPT

## 2019-11-04 PROCEDURE — 85025 COMPLETE CBC W/AUTO DIFF WBC: CPT

## 2019-11-04 PROCEDURE — 81003 URINALYSIS AUTO W/O SCOPE: CPT

## 2019-11-04 PROCEDURE — 96375 TX/PRO/DX INJ NEW DRUG ADDON: CPT

## 2019-11-04 RX ORDER — SODIUM CHLORIDE 9 MG/ML
1000 INJECTION INTRAMUSCULAR; INTRAVENOUS; SUBCUTANEOUS ONCE
Refills: 0 | Status: COMPLETED | OUTPATIENT
Start: 2019-11-04 | End: 2019-11-04

## 2019-11-04 RX ORDER — HYDROCHLOROTHIAZIDE 25 MG
0 TABLET ORAL
Qty: 0 | Refills: 0 | DISCHARGE

## 2019-11-04 RX ORDER — FAMOTIDINE 10 MG/ML
20 INJECTION INTRAVENOUS ONCE
Refills: 0 | Status: COMPLETED | OUTPATIENT
Start: 2019-11-04 | End: 2019-11-04

## 2019-11-04 RX ORDER — ONDANSETRON 8 MG/1
4 TABLET, FILM COATED ORAL ONCE
Refills: 0 | Status: COMPLETED | OUTPATIENT
Start: 2019-11-04 | End: 2019-11-04

## 2019-11-04 RX ADMIN — SODIUM CHLORIDE 1000 MILLILITER(S): 9 INJECTION INTRAMUSCULAR; INTRAVENOUS; SUBCUTANEOUS at 10:45

## 2019-11-04 RX ADMIN — ONDANSETRON 4 MILLIGRAM(S): 8 TABLET, FILM COATED ORAL at 10:45

## 2019-11-04 RX ADMIN — FAMOTIDINE 20 MILLIGRAM(S): 10 INJECTION INTRAVENOUS at 10:45

## 2019-11-04 RX ADMIN — Medication 30 MILLILITER(S): at 10:45

## 2019-11-04 NOTE — ED PROVIDER NOTE - NSFOLLOWUPINSTRUCTIONS_ED_ALL_ED_FT
Viral Illness, Adult  Viruses are tiny germs that can get into a person's body and cause illness. There are many different types of viruses, and they cause many types of illness. Viral illnesses can range from mild to severe. They can affect various parts of the body.  Common illnesses that are caused by a virus include colds and the flu. Viral illnesses also include serious conditions such as HIV/AIDS (human immunodeficiency virus/acquired immunodeficiency syndrome). A few viruses have been linked to certain cancers.  What are the causes?  Many types of viruses can cause illness. Viruses invade cells in your body, multiply, and cause the infected cells to malfunction or die. When the cell dies, it releases more of the virus. When this happens, you develop symptoms of the illness, and the virus continues to spread to other cells. If the virus takes over the function of the cell, it can cause the cell to divide and grow out of control, as is the case when a virus causes cancer.  Different viruses get into the body in different ways. You can get a virus by:  Swallowing food or water that is contaminated with the virus.Breathing in droplets that have been coughed or sneezed into the air by an infected person.Touching a surface that has been contaminated with the virus and then touching your eyes, nose, or mouth.Being bitten by an insect or animal that carries the virus.Having sexual contact with a person who is infected with the virus.Being exposed to blood or fluids that contain the virus, either through an open cut or during a transfusion.If a virus enters your body, your body's defense system (immune system) will try to fight the virus. You may be at higher risk for a viral illness if your immune system is weak.  What are the signs or symptoms?  Symptoms vary depending on the type of virus and the location of the cells that it invades. Common symptoms of the main types of viral illnesses include:  Cold and flu viruses     Fever.Headache.Sore throat.Muscle aches.Nasal congestion.Cough.Digestive system (gastrointestinal) viruses     Fever.Abdominal pain.Nausea.Diarrhea.Liver viruses (hepatitis)     Loss of appetite.Tiredness.Yellowing of the skin (jaundice).Brain and spinal cord viruses     Fever.Headache.Stiff neck.Nausea and vomiting.Confusion or sleepiness.Skin viruses     Warts.Itching.Rash.Sexually transmitted viruses     Discharge.Swelling.Redness.Rash.How is this treated?  Viruses can be difficult to treat because they live within cells. Antibiotic medicines do not treat viruses because these drugs do not get inside cells. Treatment for a viral illness may include:  Resting and drinking plenty of fluids.Medicines to relieve symptoms. These can include over-the-counter medicine for pain and fever, medicines for cough or congestion, and medicines to relieve diarrhea.Antiviral medicines. These drugs are available only for certain types of viruses. They may help reduce flu symptoms if taken early. There are also many antiviral medicines for hepatitis and HIV/AIDS.Some viral illnesses can be prevented with vaccinations. A common example is the flu shot.  Follow these instructions at home:  Medicines     Image   Take over-the-counter and prescription medicines only as told by your health care provider.If you were prescribed an antiviral medicine, take it as told by your health care provider. Do not stop taking the medicine even if you start to feel better.Be aware of when antibiotics are needed and when they are not needed. Antibiotics do not treat viruses. If your health care provider thinks that you may have a bacterial infection as well as a viral infection, you may get an antibiotic.  Do not ask for an antibiotic prescription if you have been diagnosed with a viral illness. That will not make your illness go away faster.Frequently taking antibiotics when they are not needed can lead to antibiotic resistance. When this develops, the medicine no longer works against the bacteria that it normally fights.General instructions     Drink enough fluids to keep your urine clear or pale yellow.Rest as much as possible.Return to your normal activities as told by your health care provider. Ask your health care provider what activities are safe for you.Keep all follow-up visits as told by your health care provider. This is important.How is this prevented?  Take these actions to reduce your risk of viral infection:Image  Eat a healthy diet and get enough rest.Wash your hands often with soap and water. This is especially important when you are in public places. If soap and water are not available, use hand .Avoid close contact with friends and family who have a viral illness.If you travel to areas where viral gastrointestinal infection is common, avoid drinking water or eating raw food.Keep your immunizations up to date. Get a flu shot every year as told by your health care provider.Do not share toothbrushes, nail clippers, razors, or needles with other people.Always practice safe sex.Contact a health care provider if:  You have symptoms of a viral illness that do not go away.Your symptoms come back after going away.Your symptoms get worse.Get help right away if:  You have trouble breathing.You have a severe headache or a stiff neck.You have severe vomiting or abdominal pain.This information is not intended to replace advice given to you by your health care provider. Make sure you discuss any questions you have with your health care provider.    Document Released: 04/28/2017 Document Revised: 05/31/2017 Document Reviewed: 04/28/2017

## 2019-11-04 NOTE — ED PROVIDER NOTE - ATTENDING CONTRIBUTION TO CARE
39 year old f w hx of HIV presents to ED with bodyaches, fatigue over the past several days.  No fever or chills.  Compliant with HIV regimen.  No sick contacts.  On my face to face ED eval, patient is well appearing.  HRRR, lungs clear.  Abd soft, NT/ND.  Will check labs, urinalysis , give IVF and dispo accordingly.

## 2019-11-04 NOTE — ED PROVIDER NOTE - PATIENT PORTAL LINK FT
You can access the FollowMyHealth Patient Portal offered by [facility] by registering at the following website: [email or web address]. By joining [employer]’s FollowMyHealth portal, you will also be able to view your health information using other applications (apps) compatible with our system.

## 2019-11-04 NOTE — ED PROVIDER NOTE - CHPI ED SYMPTOMS NEG
no syncope, changes in baseline LE swelling, difficulty urinating/defecating, cough, fever, recent surgery

## 2019-11-04 NOTE — ED ADULT TRIAGE NOTE - CHIEF COMPLAINT QUOTE
patient complains fo abdominal pain with vomiting since last night. she states "I think it may be from taking motrin on an empty stomach" hx of HIV, compliant with medications. chris chest pain, fever, chills

## 2019-11-04 NOTE — ED PROVIDER NOTE - CLINICAL SUMMARY MEDICAL DECISION MAKING FREE TEXT BOX
38 y/o female well-appearing in NAD is here with diffused body aches and overall malaise that started yesterday. No URI/UTI symptoms. Pt reports mild diffused abdominal pain without ttp. Fluid hydration with gi cocktail and nml labs. pt advised to follow-up with pmd.

## 2019-11-04 NOTE — ED PROVIDER NOTE - OBJECTIVE STATEMENT
40 y/o F with PMHx of HIV, HTN and no significant PSHx c/o abdominal pain since yesterday. Pt relates associated sxs of lightheadedness, head- heaviness, chills, body aches, weakness, and vomiting. The pt has taken Motrin to no significant relief. The pt denies syncope, changes in baseline LE swelling, difficulty urinating/defecating, cough, fever, recent surgery or any other acute complaints. NKDA.

## 2019-11-04 NOTE — ED PROVIDER NOTE - PHYSICAL EXAMINATION
CONSTITUTIONAL: Well-developed; well-nourished; in no acute distress.  SKIN: Warm and dry, no acute rash.  HEAD: Normocephalic; atraumatic.  EYES: PERRL, EOM intact; conjunctiva and sclera clear.  ENT: No nasal discharge; airway clear.  NECK: Supple; non tender.  CARD: S1, S2 normal; no murmurs, gallops, or rubs. Regular rate and rhythm.  RESP: No wheezes, rales or rhonchi.  ABD: Normal bowel sounds; soft; non-distended; non-tender; no hepatosplenomegaly.  EXT: Normal ROM. No clubbing, cyanosis or edema.  LYMPH: No acute cervical adenopathy.  NEURO: Alert, oriented. Grossly unremarkable.  PSYCH: Cooperative, appropriate.

## 2019-11-04 NOTE — ED ADULT NURSE NOTE - NSIMPLEMENTINTERV_GEN_ALL_ED
Implemented All Universal Safety Interventions:  [place] to call system. Call bell, personal items and telephone within reach. Instruct patient to call for assistance. Room bathroom lighting operational. Non-slip footwear when patient is off stretcher. Physically safe environment: no spills, clutter or unnecessary equipment. Stretcher in lowest position, wheels locked, appropriate side rails in place.

## 2019-11-04 NOTE — ED ADULT NURSE NOTE - OBJECTIVE STATEMENT
39y F, A&ox3, presents to ed for abdominal pain starting yesterday after using motrin. Reports normally on HIV antivirals since 2010 however yesterday took motrin for first time in "a while" and reports since then c/o abd discomfort with some nausea and x1 episode of nonbloody emesis. No fevers nor chills. No cp, no sob. No urinary s/s.

## 2019-11-08 DIAGNOSIS — R10.84 GENERALIZED ABDOMINAL PAIN: ICD-10-CM

## 2019-11-08 DIAGNOSIS — B34.9 VIRAL INFECTION, UNSPECIFIED: ICD-10-CM

## 2022-10-12 NOTE — ED ADULT NURSE NOTE - MODE OF DISCHARGE
10/12/2022                   Zahra Reeder  [address]  [facility] [address]      Dear Zahra,    We are writing to inform you that you are due for your annual Medicare Wellness Exam.    This appointment will focus on preventive care including a Personalized Prevention Plan, a check of your weight, blood pressure and pulse, a brief check of your hearing and vision, any immunizations you may need, as well as referrals for any other screening services or test you may need.    Medicare will cover the cost of the preventive care services.  Any additional services including lab and some immunizations may not be a covered service.  If you have any other health concerns or questions to discuss with your provider, a medical visit may be added to your wellness visit and you may need to pay a deductible or co-pay depending on your Medicare plan.    Please call our office at [phone] to schedule your appointment with Anahy Jacobo MD .    We look forward to seeing you soon.    Anahy Jacobo MD  [address] [facility] [address]  [phone]  
Ambulatory

## 2023-01-26 NOTE — ED PROVIDER NOTE - NSFOLLOWUPINSTRUCTIONS_ED_ALL_ED_FT
Name of Patient : TAMI DUNHAM  MRN: [id number]  Date of visit: 01-26-23       Subjective: Patient seen and examined. No new events except as noted.   calm  doing okay     REVIEW OF SYSTEMS:    CONSTITUTIONAL: No weakness, fevers or chills  EYES/ENT: No visual changes;  No vertigo or throat pain   NECK: No pain or stiffness  RESPIRATORY: No cough, wheezing, hemoptysis; No shortness of breath  CARDIOVASCULAR: No chest pain or palpitations  GASTROINTESTINAL: No abdominal or epigastric pain. No nausea, vomiting, or hematemesis; No diarrhea or constipation. No melena or hematochezia.  GENITOURINARY: No dysuria, frequency or hematuria  NEUROLOGICAL: No numbness or weakness  SKIN: No itching, burning, rashes, or lesions   All other review of systems is negative unless indicated above.    MEDICATIONS:  MEDICATIONS  (STANDING):  aspirin enteric coated 81 milliGRAM(s) Oral daily  chlorhexidine 2% Cloths 1 Application(s) Topical daily  heparin  Infusion. 2000 Unit(s)/Hr (20 mL/Hr) IV Continuous <Continuous>  lacosamide IVPB 100 milliGRAM(s) IV Intermittent every 12 hours  levETIRAcetam  IVPB 1500 milliGRAM(s) IV Intermittent every 12 hours  lidocaine   4% Patch 1 Patch Transdermal daily  multivitamin 1 Tablet(s) Oral daily  mupirocin 2% Ointment 1 Application(s) Topical two times a day  pantoprazole  Injectable 40 milliGRAM(s) IV Push two times a day  senna 2 Tablet(s) Oral at bedtime      PHYSICAL EXAM:  T(C): 36.4 (01-26-23 @ 21:23), Max: 37.1 (01-26-23 @ 01:00)  HR: 85 (01-26-23 @ 21:23) (80 - 89)  BP: 118/69 (01-26-23 @ 21:23) (100/66 - 121/71)  RR: 18 (01-26-23 @ 21:23) (16 - 18)  SpO2: 97% (01-26-23 @ 21:23) (96% - 100%)  Wt(kg): --  I&O's Summary    25 Jan 2023 07:01  -  26 Jan 2023 07:00  --------------------------------------------------------  IN: 0 mL / OUT: 780 mL / NET: -780 mL          Appearance: Normal	  HEENT:  PERRLA   Lymphatic: No lymphadenopathy   Cardiovascular: Normal S1 S2, no JVD  Respiratory: normal effort , clear  Gastrointestinal:  Soft, Non-tender  Skin: No rashes,  warm to touch  Psychiatry:  Mood & affect appropriate  Musculuskeletal: LE dressing     recent labs, Imaging and EKGs personally reviewed       1-25-23 @ 07:01  -  01-26-23 @ 07:00  --------------------------------------------------------  IN: 0 mL / OUT: 780 mL / NET: -780 mL                          8.9    12.51 )-----------( 602      ( 26 Jan 2023 16:28 )             27.6               01-26    136  |  102  |  14  ----------------------------<  147<H>  3.8   |  28  |  0.80    Ca    8.5      26 Jan 2023 06:00  Phos  4.0     01-26  Mg     1.90     01-26    TPro  5.9<L>  /  Alb  2.6<L>  /  TBili  0.4  /  DBili  <0.2  /  AST  12  /  ALT  40  /  AlkPhos  130<H>  01-26    PTT - ( 26 Jan 2023 06:00 )  PTT:87.1 sec                              I have discussed the discharge plan with the patient. The patient agrees with the plan, as discussed.  The patient understands Emergency Department diagnosis is a preliminary diagnosis often based on limited information and that the patient must adhere to the follow-up plan as discussed.  The patient understands that if the symptoms worsen or if prescribed medications do not have the desired/planned effect that the patient may return to the Emergency Department at any time for further evaluation and treatment.    Acute Knee Pain, Adult  Many things can cause knee pain. The pain often goes away on its own with time and rest. If the pain does not go away, tests may be done to find out what is causing the pain.    Follow these instructions at home:  Activity     Rest your knee.  Do not do things that cause pain.  Avoid activities where both feet leave the ground at the same time (high-impact activities). Examples are running, jumping rope, and doing jumping jacks.  General instructions     Image   Take medicines only as told by your doctor.  Raise (elevate) your knee when you are resting. Make sure your knee is higher than your heart.  Sleep with a pillow under your knee.  If told, put ice on the knee:  Put ice in a plastic bag.  Place a towel between your skin and the bag.  Leave the ice on for 20 minutes, 2–3 times a day.  Ask your doctor if you should wear an elastic knee support.  Lose weight if you are overweight. Being overweight can make your knee hurt more.  Do not use any tobacco products. These include cigarettes, chewing tobacco, or electronic cigarettes. If you need help quitting, ask your doctor. Smoking may slow down healing.  Contact a doctor if:  The pain does not stop.  The pain changes or gets worse.  You have a fever along with knee pain.  Your knee gives out or locks up.  Your knee swells, and becomes worse.  Get help right away if:  Your knee feels warm.  You cannot move your knee.  You have very bad knee pain.  You have chest pain.  You have trouble breathing.  Summary  Many things can cause knee pain. The pain often goes away on its own with time and rest.  Avoid activities that put stress on your knee. These include running and jumping rope.  Get help right away if you cannot move your knee, or if your knee feels warm, or if you have trouble breathing.  This information is not intended to replace advice given to you by your health care provider. Make sure you discuss any questions you have with your health care provider.

## 2023-01-27 NOTE — ED PROVIDER NOTE - CARDIAC, MLM
Normal rate, regular rhythm.  Heart sounds S1, S2.  significant b/l LE lymphedema. normal DP pulses b/l, well perfused.
Previous Labs: No
How Did The Hair Loss Occur?: gradual in onset
How Severe Is Your Hair Loss?: mild

## 2023-03-16 NOTE — ED ADULT NURSE NOTE - NS_SISCREENINGSR_GEN_ALL_ED
Patient on call light- reports feeling dizzy and diaphoretic. Repeat EKG ordered, EDT obtaining it. Dr. Pena notified   Negative

## 2023-10-30 NOTE — ED ADULT NURSE NOTE - NSSUSCREENINGQ4_ED_ALL_ED
No There are no Wet Read(s) to document. There is 1 Wet Read(s) to document. There are 2 Wet Read(s) to document.
